# Patient Record
Sex: MALE | Race: WHITE | NOT HISPANIC OR LATINO | URBAN - METROPOLITAN AREA
[De-identification: names, ages, dates, MRNs, and addresses within clinical notes are randomized per-mention and may not be internally consistent; named-entity substitution may affect disease eponyms.]

---

## 2017-03-30 ENCOUNTER — ALLSCRIPTS OFFICE VISIT (OUTPATIENT)
Dept: OTHER | Facility: OTHER | Age: 62
End: 2017-03-30

## 2017-03-30 ENCOUNTER — LAB REQUISITION (OUTPATIENT)
Dept: LAB | Facility: HOSPITAL | Age: 62
End: 2017-03-30
Payer: COMMERCIAL

## 2017-03-30 DIAGNOSIS — Z23 ENCOUNTER FOR IMMUNIZATION: ICD-10-CM

## 2017-03-30 DIAGNOSIS — I10 ESSENTIAL (PRIMARY) HYPERTENSION: ICD-10-CM

## 2017-03-30 DIAGNOSIS — R73.03 PREDIABETES: ICD-10-CM

## 2017-03-30 DIAGNOSIS — Z12.5 ENCOUNTER FOR SCREENING FOR MALIGNANT NEOPLASM OF PROSTATE: ICD-10-CM

## 2017-03-30 DIAGNOSIS — N52.9 MALE ERECTILE DYSFUNCTION: ICD-10-CM

## 2017-03-30 DIAGNOSIS — N28.9 DISORDER OF KIDNEY AND URETER: ICD-10-CM

## 2017-03-30 LAB
ALBUMIN SERPL BCP-MCNC: 3.7 G/DL (ref 3.5–5)
ALP SERPL-CCNC: 59 U/L (ref 46–116)
ALT SERPL W P-5'-P-CCNC: 25 U/L (ref 12–78)
ANION GAP SERPL CALCULATED.3IONS-SCNC: 6 MMOL/L (ref 4–13)
AST SERPL W P-5'-P-CCNC: 14 U/L (ref 5–45)
BASOPHILS # BLD AUTO: 0.01 THOUSANDS/ΜL (ref 0–0.1)
BASOPHILS NFR BLD AUTO: 0 % (ref 0–1)
BILIRUB SERPL-MCNC: 0.59 MG/DL (ref 0.2–1)
BUN SERPL-MCNC: 16 MG/DL (ref 5–25)
CALCIUM SERPL-MCNC: 9.3 MG/DL (ref 8.3–10.1)
CHLORIDE SERPL-SCNC: 105 MMOL/L (ref 100–108)
CHOLEST SERPL-MCNC: 148 MG/DL (ref 50–200)
CO2 SERPL-SCNC: 30 MMOL/L (ref 21–32)
CREAT SERPL-MCNC: 1.06 MG/DL (ref 0.6–1.3)
EOSINOPHIL # BLD AUTO: 0.19 THOUSAND/ΜL (ref 0–0.61)
EOSINOPHIL NFR BLD AUTO: 3 % (ref 0–6)
ERYTHROCYTE [DISTWIDTH] IN BLOOD BY AUTOMATED COUNT: 14.5 % (ref 11.6–15.1)
EST. AVERAGE GLUCOSE BLD GHB EST-MCNC: 126 MG/DL
GFR SERPL CREATININE-BSD FRML MDRD: >60 ML/MIN/1.73SQ M
GLUCOSE P FAST SERPL-MCNC: 101 MG/DL (ref 65–99)
HBA1C MFR BLD: 6 % (ref 4.2–6.3)
HCT VFR BLD AUTO: 48.9 % (ref 36.5–49.3)
HDLC SERPL-MCNC: 57 MG/DL (ref 40–60)
HGB BLD-MCNC: 16.2 G/DL (ref 12–17)
LDLC SERPL CALC-MCNC: 71 MG/DL (ref 0–100)
LYMPHOCYTES # BLD AUTO: 2.81 THOUSANDS/ΜL (ref 0.6–4.47)
LYMPHOCYTES NFR BLD AUTO: 38 % (ref 14–44)
MCH RBC QN AUTO: 28.7 PG (ref 26.8–34.3)
MCHC RBC AUTO-ENTMCNC: 33.1 G/DL (ref 31.4–37.4)
MCV RBC AUTO: 87 FL (ref 82–98)
MONOCYTES # BLD AUTO: 0.82 THOUSAND/ΜL (ref 0.17–1.22)
MONOCYTES NFR BLD AUTO: 11 % (ref 4–12)
NEUTROPHILS # BLD AUTO: 3.48 THOUSANDS/ΜL (ref 1.85–7.62)
NEUTS SEG NFR BLD AUTO: 48 % (ref 43–75)
NRBC BLD AUTO-RTO: 0 /100 WBCS
PLATELET # BLD AUTO: 245 THOUSANDS/UL (ref 149–390)
PMV BLD AUTO: 10.2 FL (ref 8.9–12.7)
POTASSIUM SERPL-SCNC: 4.6 MMOL/L (ref 3.5–5.3)
PROT SERPL-MCNC: 7.4 G/DL (ref 6.4–8.2)
PSA SERPL-MCNC: 1.1 NG/ML (ref 0–4)
RBC # BLD AUTO: 5.65 MILLION/UL (ref 3.88–5.62)
SODIUM SERPL-SCNC: 141 MMOL/L (ref 136–145)
TRIGL SERPL-MCNC: 100 MG/DL
TSH SERPL DL<=0.05 MIU/L-ACNC: 1.13 UIU/ML (ref 0.36–3.74)
WBC # BLD AUTO: 7.32 THOUSAND/UL (ref 4.31–10.16)

## 2017-03-30 PROCEDURE — 84443 ASSAY THYROID STIM HORMONE: CPT | Performed by: FAMILY MEDICINE

## 2017-03-30 PROCEDURE — G0103 PSA SCREENING: HCPCS | Performed by: FAMILY MEDICINE

## 2017-03-30 PROCEDURE — 80053 COMPREHEN METABOLIC PANEL: CPT | Performed by: FAMILY MEDICINE

## 2017-03-30 PROCEDURE — 85025 COMPLETE CBC W/AUTO DIFF WBC: CPT | Performed by: FAMILY MEDICINE

## 2017-03-30 PROCEDURE — 83036 HEMOGLOBIN GLYCOSYLATED A1C: CPT | Performed by: FAMILY MEDICINE

## 2017-03-30 PROCEDURE — 80061 LIPID PANEL: CPT | Performed by: FAMILY MEDICINE

## 2017-04-01 ENCOUNTER — GENERIC CONVERSION - ENCOUNTER (OUTPATIENT)
Dept: OTHER | Facility: OTHER | Age: 62
End: 2017-04-01

## 2017-10-03 ENCOUNTER — ALLSCRIPTS OFFICE VISIT (OUTPATIENT)
Dept: OTHER | Facility: OTHER | Age: 62
End: 2017-10-03

## 2017-10-03 ENCOUNTER — LAB REQUISITION (OUTPATIENT)
Dept: LAB | Facility: HOSPITAL | Age: 62
End: 2017-10-03
Payer: COMMERCIAL

## 2017-10-03 DIAGNOSIS — N52.9 MALE ERECTILE DYSFUNCTION: ICD-10-CM

## 2017-10-03 DIAGNOSIS — R73.03 PREDIABETES: ICD-10-CM

## 2017-10-03 DIAGNOSIS — I10 ESSENTIAL (PRIMARY) HYPERTENSION: ICD-10-CM

## 2017-10-03 DIAGNOSIS — N28.9 DISORDER OF KIDNEY AND URETER: ICD-10-CM

## 2017-10-03 DIAGNOSIS — N40.0 ENLARGED PROSTATE WITHOUT LOWER URINARY TRACT SYMPTOMS (LUTS): ICD-10-CM

## 2017-10-03 LAB
ALBUMIN SERPL BCP-MCNC: 3.8 G/DL (ref 3.5–5)
ALP SERPL-CCNC: 59 U/L (ref 46–116)
ALT SERPL W P-5'-P-CCNC: 25 U/L (ref 12–78)
ANION GAP SERPL CALCULATED.3IONS-SCNC: 7 MMOL/L (ref 4–13)
AST SERPL W P-5'-P-CCNC: 18 U/L (ref 5–45)
BILIRUB SERPL-MCNC: 0.78 MG/DL (ref 0.2–1)
BUN SERPL-MCNC: 15 MG/DL (ref 5–25)
CALCIUM SERPL-MCNC: 9.7 MG/DL (ref 8.3–10.1)
CHLORIDE SERPL-SCNC: 105 MMOL/L (ref 100–108)
CO2 SERPL-SCNC: 26 MMOL/L (ref 21–32)
CREAT SERPL-MCNC: 1.06 MG/DL (ref 0.6–1.3)
EST. AVERAGE GLUCOSE BLD GHB EST-MCNC: 123 MG/DL
GFR SERPL CREATININE-BSD FRML MDRD: 74 ML/MIN/1.73SQ M
GLUCOSE SERPL-MCNC: 112 MG/DL (ref 65–140)
HBA1C MFR BLD: 5.9 % (ref 4.2–6.3)
POTASSIUM SERPL-SCNC: 5.2 MMOL/L (ref 3.5–5.3)
PROT SERPL-MCNC: 7.4 G/DL (ref 6.4–8.2)
SODIUM SERPL-SCNC: 138 MMOL/L (ref 136–145)

## 2017-10-03 PROCEDURE — 80053 COMPREHEN METABOLIC PANEL: CPT | Performed by: FAMILY MEDICINE

## 2017-10-03 PROCEDURE — 83036 HEMOGLOBIN GLYCOSYLATED A1C: CPT | Performed by: FAMILY MEDICINE

## 2017-10-05 NOTE — PROGRESS NOTES
Assessment  1  Benign essential hypertension (401 1) (I10)   2  Prediabetes (790 29) (R73 03)   3  Erectile dysfunction (607 84) (N52 9)   4  Abnormal kidney function (593 9) (N28 9)   5  BPH (benign prostatic hyperplasia) (600 00) (N40 0)    Plan  Abnormal kidney function, Benign essential hypertension, BPH (benign prostatic  hyperplasia), Erectile dysfunction, Prediabetes    · Routine Venipuncture - POC; Status:Complete;   Done: [id number] 09:05AM   · (1) COMPREHENSIVE METABOLIC PANEL; Status:Complete;   Done: 65DWR1844  09:04AM   · (1) HEMOGLOBIN A1C; Status:Complete;   Done: 67AXF6785 09:04AM  Benign essential hypertension    · Lisinopril 10 MG Oral Tablet; Take 1 tablet daily  BPH (benign prostatic hyperplasia)    · Tamsulosin HCl - 0 4 MG Oral Capsule; take 1 capsule daily  Need for influenza vaccination    · Fluzone Quadrivalent Intramuscular Suspension    Discussion/Summary    Patient is a 19-year-old MHTN - blood pressure appears stable today  Continue with current treatment of lisinopril  He was encouraged to try to check his blood pressure at home  Prediabetes - recheck blood work including CBC, CMP, TSH, FLP, PSA, A1c  Continue with strict dietary and exercise plan  TANVIR - stable  Continue with current treatment of lisinopril  He was advised on the importance of maintaining proper fluid hydration area  Follow-up if any symptoms are worseningBPH - Sx appear sec to prostatic hypertrophy  start tx with tamsulosin  F/u if sx are worsening or persistent  Chief Complaint  Pt presents for 6 month check up with no recent labs  Pt needs refill on Lisinopril  Pt would like fasting blood work done today  Pt would like flu shot today  Patient is here today for follow up of chronic conditions described in HPI  History of Present Illness  Pt is a 59 yo M presents today for a f/u on his chronic conditions  He has HTN, prediabetes, erectile dysfunction   He has been taking his medications reg and tolerating them very well  He has a cc of increased urinary freq at night time  denies dyuria, feveres chills, urgency  Review of Systems    Constitutional: not feeling poorly-and-not feeling tired  Eyes: no eyesight problems-and-no purulent discharge from the eyes  ENT: no sore throat-and-no nasal discharge  Cardiovascular: no chest pain-and-no palpitations  Respiratory: no cough-and-no shortness of breath during exertion  Gastrointestinal: no nausea-and-no diarrhea  Genitourinary: no dysuria-and-no nocturia  Musculoskeletal: no arthralgias-and-no myalgias  Integumentary: no rashes-and-no skin lesions  Neurological: no headache,-no numbness-and-no dizziness  Psychiatric: no anxiety-and-no depression  Endocrine: no muscle weakness-and-no erectile dysfunction  Hematologic/Lymphatic: no tendency for easy bleeding-and-no tendency for easy bruising  Active Problems  1  Abnormal kidney function (593 9) (N28 9)   2  Benign essential hypertension (401 1) (I10)   3  Encounter for screening for malignant neoplasm of prostate (V76 44) (Z12 5)   4  Erectile dysfunction (607 84) (N52 9)   5  Prediabetes (790 29) (R73 03)   6  Screening for colon cancer (V76 51) (Z12 11)    Past Medical History  1  History of Diverticulitis (562 11) (K57 92)   2  History of Fracture of great toe, left, closed (826 0) (S92 402A)   3  History of hypertension (V12 59) (Z86 79)   4  History of IFG (impaired fasting glucose) (790 21) (R73 01)   5  History of Inguinal hernia (550 90) (K40 90)   6  History of Need for influenza vaccination (V04 81) (Z23)   7  History of Positive PPD (795 51) (R76 11)   8  History of Screening for cholesterol level (V77 91) (Z13 220)   9  History of Screening for diabetes mellitus (V77 1) (Z13 1)   10  History of Screening for prostate cancer (V76 44) (Z12 5)   11  History of Screening for thyroid disorder (V77 0) (Z13 29)   12   History of Verruca warts (infectious) (078 19) (B07 9)    The active problems and past medical history were reviewed and updated today  Surgical History    The surgical history was reviewed and updated today  Family History  Mother    1  Family history of kidney disease (V18 69) (Z84 1)  Father    2  Family history of tremor (V17 2) (Z82 0)   3  Family history of Type 2 diabetes mellitus with complication  Brother    4  Family history of tremor (V17 2) (Z82 0)  Paternal Grandfather    5  Family history of hypertension (V17 49) (Z82 49)   6  Family history of Type 2 diabetes mellitus with complication  Uncle    7  Family history of Type 2 diabetes mellitus with complication    The family history was reviewed and updated today  Social History   · Denied: History of Alcohol use   · Denied: History of Drug use   · Never a smoker  The social history was reviewed and updated today  The social history was reviewed and is unchanged  Current Meds   1  Lisinopril 10 MG Oral Tablet; Take 1 tablet daily; Therapy: 25Apr2016 to (Last Rx:30Mar2017)  Requested for: 18Apr2017; Status: ACTIVE   - Renewal Denied Ordered    The medication list was reviewed and updated today  Allergies  1  No Known Drug Allergies    Vitals  Vital Signs    Recorded: 39BVB7654 08:07AM   Temperature 96 2 F, Tympanic   Heart Rate 92   Pulse Quality Normal   Respiration Quality Normal   Respiration 18   Systolic 328, LUE, Sitting   Diastolic 88, LUE, Sitting   Height 5 ft 2 in   Weight 136 lb 5 oz   BMI Calculated 24 93   BSA Calculated 1 62   O2 Saturation 98, RA   Pain Scale 0     Physical Exam    Constitutional   General appearance: No acute distress, well appearing and well nourished  Eyes   Conjunctiva and lids: No swelling, erythema, or discharge  Pupils and irises: Equal, round and reactive to light  Ears, Nose, Mouth, and Throat   External inspection of ears and nose: Normal     Otoscopic examination: Tympanic membrance translucent with normal light reflex   Canals patent without erythema  Nasal mucosa, septum, and turbinates: Normal without edema or erythema  Oropharynx: Normal with no erythema, edema, exudate or lesions  Pulmonary   Respiratory effort: No increased work of breathing or signs of respiratory distress  Auscultation of lungs: Clear to auscultation, equal breath sounds bilaterally, no wheezes, no rales, no rhonci  Cardiovascular   Auscultation of heart: Normal rate and rhythm, normal S1 and S2, without murmurs  Examination of extremities for edema and/or varicosities: Normal     Abdomen   Abdomen: Non-tender, no masses  Liver and spleen: No hepatomegaly or splenomegaly  Lymphatic   Palpation of lymph nodes in neck: No lymphadenopathy  Musculoskeletal   Gait and station: Normal     Inspection/palpation of joints, bones, and muscles: Normal     Skin   Skin and subcutaneous tissue: Normal without rashes or lesions  Neurologic   Cranial nerves: Cranial nerves 2-12 intact  Sensation: No sensory loss  Psychiatric   Orientation to person, place and time: Normal     Mood and affect: Normal          Results/Data  (1) COMPREHENSIVE METABOLIC PANEL [id number] 18:32WT Walter Matters Order Number: [id number]     Test Name Result Flag Reference   GLUCOSE,RANDM 112 mg/dL     If the patient is fasting, the ADA then defines impaired fasting glucose as > 100 mg/dL and diabetes as > or equal to 123 mg/dL  Specimen collection should occur prior to Sulfasalazine administration due to the potential for falsely depressed results  Specimen collection should occur prior to Sulfapyridine administration due to the potential for falsely elevated results     SODIUM 138 mmol/L  136-145   POTASSIUM 5 2 mmol/L  3 5-5 3   CHLORIDE 105 mmol/L  100-108   CARBON DIOXIDE 26 mmol/L  21-32   ANION GAP (CALC) 7 mmol/L  4-13   BLOOD UREA NITROGEN 15 mg/dL  5-25   CREATININE 1 06 mg/dL  0 60-1 30   Standardized to IDMS reference method   CALCIUM 9 7 mg/dL 8  3-10 1   BILI, TOTAL 0 78 mg/dL  0 20-1 00   ALK PHOSPHATAS 59 U/L     ALT (SGPT) 25 U/L  12-78   Specimen collection should occur prior to Sulfasalazine and/or Sulfapyridine administration due to the potential for falsely depressed results  AST(SGOT) 18 U/L  5-45   Specimen collection should occur prior to Sulfasalazine administration due to the potential for falsely depressed results  ALBUMIN 3 8 g/dL  3 5-5 0   TOTAL PROTEIN 7 4 g/dL  6 4-8 2   eGFR 74 ml/min/1 73sq m     [facility] Disease Education Program recommendations are as follows:  GFR calculation is accurate only with a steady state creatinine  Chronic Kidney disease less than 60 ml/min/1 73 sq  meters  Kidney failure less than 15 ml/min/1 73 sq  meters  (1) HEMOGLOBIN A1C 03Oct2017 09:04AM Kymberly Leal Order Number: [id number]     Test Name Result Flag Reference   HEMOGLOBIN A1C 5 9 %  4 2-6 3   EST  AVG  GLUCOSE 123 mg/dl         Future Appointments    Date/Time Provider Specialty Site   04/06/2018 07:45 AM Radha Kline DO [facility] [address]     Signatures   Electronically signed by :  Genna Mcgill DO; Oct  4 2017  8:49AM EST                       (Author)

## 2018-01-09 NOTE — RESULT NOTES
Message   Please call patient informed him that all of his recent bloodwork appeared stable  There was mild elevation in his kidney function test  Causes for this can be mild dehydration versus medication that he's currently on for his blood pressure Such as lisinopril  At this time, please have patient keep his scheduled appointment in April  I would like him to check his kidney function tests and again in January  I will be placing orders  Thanks     Verified Results  (1) COMPREHENSIVE METABOLIC PANEL [id number] 90:77HM John Ariaso     Test Name Result Flag Reference   GLUCOSE 109 mg/dL H 65-99   Fasting reference interval   UREA NITROGEN (BUN) 17 mg/dL  7-25   CREATININE 1 26 mg/dL H 0 70-1 25   For patients >52years of age, the reference limit  for Creatinine is approximately 13% higher for people  identified as -American  eGFR NON-AFR  AMERICAN 61 mL/min/1 73m2  > OR = 60   eGFR AFRICAN AMERICAN 70 mL/min/1 73m2  > OR = 60   BUN/CREATININE RATIO 13 (calc)  6-22   SODIUM 139 mmol/L  135-146   POTASSIUM 4 7 mmol/L  3 5-5 3   CHLORIDE 105 mmol/L     CARBON DIOXIDE 26 mmol/L  20-31   CALCIUM 9 6 mg/dL  8 6-10 3   PROTEIN, TOTAL 6 8 g/dL  6 1-8 1   ALBUMIN 4 2 g/dL  3 6-5 1   GLOBULIN 2 6 g/dL (calc)  1 9-3 7   ALBUMIN/GLOBULIN RATIO 1 6 (calc)  1 0-2 5   BILIRUBIN, TOTAL 0 7 mg/dL  0 2-1 2   ALKALINE PHOSPHATASE 56 U/L     AST 16 U/L  10-35   ALT 17 U/L  9-46     (Q) LIPID PANEL WITH REFLEX TO DIRECT LDL [id number] 12:00AM Ailyn Lucero     Test Name Result Flag Reference   CHOLESTEROL, TOTAL 163 mg/dL  125-200   HDL CHOLESTEROL 54 mg/dL  > OR = 40   TRIGLICERIDES 608 mg/dL H <150   LDL-CHOLESTEROL 76 mg/dL (calc)  <130   Desirable range <100 mg/dL for patients with CHD or  diabetes and <70 mg/dL for diabetic patients with  known heart disease     CHOL/HDLC RATIO 3 0 (calc)  < OR = 5 0   NON HDL CHOLESTEROL 109 mg/dL (calc)     Target for non-HDL cholesterol is 30 mg/dL higher than   LDL cholesterol target  (Q) HEMOGLOBIN A1c [id number] 12:00AM [facility]ster Edson Rodrigo     Test Name Result Flag Reference   HEMOGLOBIN A1c 6 0 % of total Hgb H <5 7   According to ADA guidelines, hemoglobin A1c <7 0%  represents optimal control in non-pregnant diabetic  patients  Different metrics may apply to specific  patient populations  Standards of Medical Care in    Diabetes Care  2013;36:s11-s66     For the purpose of screening for the presence of  diabetes  <5 7%       Consistent with the absence of diabetes  5 7-6 4%    Consistent with increased risk for diabetes              (prediabetes)  >or=6 5%    Consistent with diabetes     This assay result is consistent with an increased risk  of diabetes  Currently, no consensus exists for use of hemoglobin  A1c for diagnosis of diabetes for children  Plan  Abnormal kidney function, Benign essential hypertension    · (1) BASIC METABOLIC PROFILE; Status:Active;  Requested RCP:[id number];

## 2018-01-10 NOTE — RESULT NOTES
Message   These call patient, reviewed all of his blood work  All levels appeared stable  Thank you very much     Verified Results  (Q) CBC (INCLUDES DIFF/PLT) (REFL) 21Apr2016 12:00AM Nic [employer]     Test Name Result Flag Reference   WHITE BLOOD CELL COUNT 7 8 Thousand/uL  3 8-10 8   RED BLOOD CELL COUNT 5 79 Million/uL  4 20-5 80   HEMOGLOBIN 16 3 g/dL  13 2-17 1   HEMATOCRIT 51 8 % H 38 5-50 0   MCV 89 5 fL  80 0-100 0   MCH 28 2 pg  27 0-33 0   MCHC 31 5 g/dL L 32 0-36 0   RDW 13 8 %  11 0-15 0   PLATELET COUNT 850 Thousand/uL  140-400   MPV 8 2 fL  7 5-11 5   ABSOLUTE NEUTROPHILS 3463 cells/uL  1685-3317   ABSOLUTE LYMPHOCYTES 3284 cells/uL  850-3900   ABSOLUTE MONOCYTES 803 cells/uL  200-950   ABSOLUTE EOSINOPHILS 234 cells/uL     ABSOLUTE BASOPHILS 16 cells/uL  0-200   NEUTROPHILS 44 4 %     LYMPHOCYTES 42 1 %     MONOCYTES 10 3 %     EOSINOPHILS 3 0 %     BASOPHILS 0 2 %       (Q) COMPREHENSIVE METABOLIC PNL W/ADJUSTED CALCIUM 21Apr2016 12:00AM Zonder     Test Name Result Flag Reference   GLUCOSE 111 mg/dL H 65-99   Fasting reference interval   UREA NITROGEN (BUN) 14 mg/dL  7-25   CREATININE 0 99 mg/dL  0 70-1 25   For patients >52years of age, the reference limit  for Creatinine is approximately 13% higher for people  identified as -American  eGFR NON-AFR   AMERICAN 81 mL/min/1 73m2  > OR = 60   eGFR AFRICAN AMERICAN 94 mL/min/1 73m2  > OR = 60   BUN/CREATININE RATIO   7-54   NOT APPLICABLE (calc)   SODIUM 139 mmol/L  135-146   POTASSIUM 4 8 mmol/L  3 5-5 3   CHLORIDE 104 mmol/L     CARBON DIOXIDE 25 mmol/L  19-30   CALCIUM 9 3 mg/dL  8 6-10 3   CALCIUM (ADJUSTED FOR$ALBUMIN) 9 5 mg/dL (calc)  8 6-10 2   PROTEIN, TOTAL 7 1 g/dL  6 1-8 1   ALBUMIN 4 1 g/dL  3 6-5 1   GLOBULIN 3 0 g/dL (calc)  1 9-3 7   ALBUMIN/GLOBULIN RATIO 1 4 (calc)  1 0-2 5   BILIRUBIN, TOTAL 0 6 mg/dL  0 2-1 2   ALKALINE PHOSPHATASE 54 U/L     AST 18 U/L  10-35   ALT 18 U/L  9-46     (Q) HEMOGLOBIN A1c WITH eAG 21Apr2016 12:00AM Kathy Ferguson     Test Name Result Flag Reference   HEMOGLOBIN A1c 5 9 % of total Hgb H <5 7   According to ADA guidelines, hemoglobin A1c <7 0%  represents optimal control in non-pregnant diabetic  patients  Different metrics may apply to specific  patient populations  Standards of Medical Care in    Diabetes Care  2013;36:s11-s66     For the purpose of screening for the presence of  diabetes  <5 7%       Consistent with the absence of diabetes  5 7-6 4%    Consistent with increased risk for diabetes              (prediabetes)  >or=6 5%    Consistent with diabetes     This assay result is consistent with an increased risk  of diabetes  Currently, no consensus exists for use of hemoglobin  A1c for diagnosis of diabetes for children  eAG (mg/dL) 123 (calc)     eAG (mmol/L) 6 8 (calc)       (Q) LIPID PANEL WITH REFLEX TO DIRECT LDL 21Apr2016 12:00AM Eduar Luceroab     Test Name Result Flag Reference   CHOLESTEROL, TOTAL 165 mg/dL  125-200   HDL CHOLESTEROL 51 mg/dL  > OR = 40   TRIGLICERIDES 349 mg/dL H <150   LDL-CHOLESTEROL 77 mg/dL (calc)  <130   Desirable range <100 mg/dL for patients with CHD or  diabetes and <70 mg/dL for diabetic patients with  known heart disease  CHOL/HDLC RATIO 3 2 (calc)  < OR = 5 0   NON HDL CHOLESTEROL 114 mg/dL (calc)     Target for non-HDL cholesterol is 30 mg/dL higher than   LDL cholesterol target  (Q) TSH, 3RD GENERATION W/REFLEX TO FT4 21Apr2016 12:00AM Nic Ihab     Test Name Result Flag Reference   TSH W/REFLEX TO FT4 1 55 mIU/L  0 40-4 50     (Q) PSA, TOTAL WITH REFLEX TO PSA, FREE 21Apr2016 12:00AM Abdmonicaal, Ihab     Test Name Result Flag Reference   TOTAL PSA 1 2 ng/mL  < OR = 4 0   This test was performed using the Cristina Chip  immunoassay method  Values obtained with other assay  methods cannot be used interchangeably   PSA levels,  regardless of value, should not be interpreted as  absolute evidence of the presence or absence of disease

## 2018-01-10 NOTE — RESULT NOTES
Verified Results  (1) COMPREHENSIVE METABOLIC PANEL [id number] 91:62WT Maggie Matos   TW Order Number: [id number]     Test Name Result Flag Reference   GLUCOSE,RANDM 112 mg/dL     If the patient is fasting, the ADA then defines impaired fasting glucose as > 100 mg/dL and diabetes as > or equal to 123 mg/dL  Specimen collection should occur prior to Sulfasalazine administration due to the potential for falsely depressed results  Specimen collection should occur prior to Sulfapyridine administration due to the potential for falsely elevated results  SODIUM 138 mmol/L  136-145   POTASSIUM 5 2 mmol/L  3 5-5 3   CHLORIDE 105 mmol/L  100-108   CARBON DIOXIDE 26 mmol/L  21-32   ANION GAP (CALC) 7 mmol/L  4-13   BLOOD UREA NITROGEN 15 mg/dL  5-25   CREATININE 1 06 mg/dL  0 60-1 30   Standardized to IDMS reference method   CALCIUM 9 7 mg/dL  8 3-10 1   BILI, TOTAL 0 78 mg/dL  0 20-1 00   ALK PHOSPHATAS 59 U/L     ALT (SGPT) 25 U/L  12-78   Specimen collection should occur prior to Sulfasalazine and/or Sulfapyridine administration due to the potential for falsely depressed results  AST(SGOT) 18 U/L  5-45   Specimen collection should occur prior to Sulfasalazine administration due to the potential for falsely depressed results  ALBUMIN 3 8 g/dL  3 5-5 0   TOTAL PROTEIN 7 4 g/dL  6 4-8 2   eGFR 74 ml/min/1 73sq m     [facility] Disease Education Program recommendations are as follows:  GFR calculation is accurate only with a steady state creatinine  Chronic Kidney disease less than 60 ml/min/1 73 sq  meters  Kidney failure less than 15 ml/min/1 73 sq  meters  (1) HEMOGLOBIN A1C 03Oct2017 09:04AM Adan Judd Order Number: [id number]     Test Name Result Flag Reference   HEMOGLOBIN A1C 5 9 %  4 2-6 3   EST  AVG   GLUCOSE 123 mg/dl         Plan  Abnormal kidney function, Benign essential hypertension, BPH (benign prostatic  hyperplasia), Erectile dysfunction, Prediabetes · Routine Venipuncture - POC; Status:Complete;   Done: 92FAN0941 09:05AM   · (1) COMPREHENSIVE METABOLIC PANEL; Status:Complete;   Done: 34JRW9067  09:04AM   · (1) HEMOGLOBIN A1C; Status:Complete;   Done: 98NUC1728 09:04AM  Benign essential hypertension    · Lisinopril 10 MG Oral Tablet;  Take 1 tablet daily  BPH (benign prostatic hyperplasia)    · Tamsulosin HCl - 0 4 MG Oral Capsule; take 1 capsule daily  Need for influenza vaccination    · Fluzone Quadrivalent Intramuscular Suspension

## 2018-01-13 VITALS
OXYGEN SATURATION: 99 % | HEART RATE: 84 BPM | BODY MASS INDEX: 25.58 KG/M2 | SYSTOLIC BLOOD PRESSURE: 130 MMHG | RESPIRATION RATE: 16 BRPM | TEMPERATURE: 97.5 F | WEIGHT: 139 LBS | HEIGHT: 62 IN | DIASTOLIC BLOOD PRESSURE: 90 MMHG

## 2018-01-14 VITALS
TEMPERATURE: 96.2 F | RESPIRATION RATE: 18 BRPM | WEIGHT: 136.31 LBS | SYSTOLIC BLOOD PRESSURE: 138 MMHG | BODY MASS INDEX: 25.08 KG/M2 | OXYGEN SATURATION: 98 % | HEIGHT: 62 IN | DIASTOLIC BLOOD PRESSURE: 88 MMHG | HEART RATE: 92 BPM

## 2018-01-15 NOTE — PROGRESS NOTES
Chief Complaint  Pt was in this morning for FBW and BP check  Pt states he took his BP this morning which read 160/91  Pt states he has been keeping a log of his BP but did not bring it with him today  Pt also did not have his BP machine to compare results in office  Pt's BP in office today was 148/84  Pt also mentioned that he has an up coming appt and will continue to keep a log and will bring it with him to his appt  Active Problems    1  Benign essential hypertension (401 1) (I10)   2  Erectile dysfunction (607 84) (N52 9)   3  Prediabetes (790 29) (R73 09)   4  Screening for cholesterol level (V77 91) (Z13 220)   5  Screening for diabetes mellitus (V77 1) (Z13 1)   6  Screening for prostate cancer (V76 44) (Z12 5)   7  Screening for thyroid disorder (V77 0) (Z13 29)   8  Verruca warts (infectious) (078 19) (B07 9)    Current Meds   1  Metoprolol Succinate ER 50 MG Oral Tablet Extended Release 24 Hour; Take 1 tablet   daily; Therapy: 59Wwt5181 to (Evaluate:11Oct2016); Last Rx:[id number] Ordered   2  Viagra 100 MG Oral Tablet; TAKE 1 TABLET DAILY 1 HOUR BEFORE NEEDED; Therapy: 88Iie8439 to (Evaluate:20May2016); Last Rx:[id number] Ordered    Allergies    1  No Known Drug Allergies    Vitals  Signs [Data Includes: Current Encounter]    Systolic: 639, LUE, Sitting  Diastolic: 84, LUE, Sitting    Assessment    1  Benign essential hypertension (401 1) (I10)   2  Erectile dysfunction (607 84) (N52 9)   3  Prediabetes (790 29) (R73 09)   4  Screening for cholesterol level (V77 91) (Z13 220)   5  Screening for diabetes mellitus (V77 1) (Z13 1)   6  Screening for prostate cancer (V76 44) (Z12 5)   7  Screening for thyroid disorder (V77 0) (Z13 29)   8   Verruca warts (infectious) (078 19) (B07 9)    Plan  Benign essential hypertension, Erectile dysfunction, Prediabetes, Screening for  cholesterol level, Screening for diabetes mellitus, Screening for prostate cancer,  Screening for thyroid disorder, Verruca warts (infectious)    · (Q) CBC (INCLUDES DIFF/PLT) (REFL); Status:Active; Requested for:14Apr2016;    · (Q) COMPREHENSIVE METABOLIC PNL W/ADJUSTED CALCIUM; Status:Active; Requested for:14Apr2016;    · (Q) HEMOGLOBIN A1c WITH eAG; Status:Active; Requested for:14Apr2016;    · (Q) LIPID PANEL WITH REFLEX TO DIRECT LDL; Status:Active; Requested  for:14Apr2016;    · (Q) PSA, TOTAL WITH REFLEX TO PSA, FREE; Status:Active; Requested for:14Apr2016;     · (Q) TSH, 3RD GENERATION W/REFLEX TO FT4; Status:Active; Requested  for:14Apr2016;   Verruca warts (infectious)    · Routine Venipuncture - POC; Status:Complete;   Done: 21Apr2016    Discussion/Summary    Follow-up with patient on his appointment on 4/25  If BP is persistently elevated, consider starting anti-hypertensive agent  Future Appointments    Date/Time Provider Specialty Site   04/25/2016 08:00 AM Curtis Rene DO [address]   10/17/2016 08:30 AM Curtis Rene DO [address]     Signatures   Electronically signed by : Ailyn Lucero DO;  Apr 22 2016  8:25AM EST                       (Author)

## 2018-01-16 NOTE — RESULT NOTES
Message   Please call patient, inform him that all of his recent bloodwork appear normal  Thank you     Verified Results  (1) CBC/PLT/DIFF [id number] 09:37PM Ailyn Lucero     Test Name Result Flag Reference   WBC COUNT 7 32 Thousand/uL  4 31-10 16   RBC COUNT 5 65 Million/uL H 3 88-5 62   HEMOGLOBIN 16 2 g/dL  12 0-17 0   HEMATOCRIT 48 9 %  36 5-49 3   MCV 87 fL  82-98   MCH 28 7 pg  26 8-34 3   MCHC 33 1 g/dL  31 4-37 4   RDW 14 5 %  11 6-15 1   MPV 10 2 fL  8 9-12 7   PLATELET COUNT 877 Thousands/uL  149-390   nRBC AUTOMATED 0 /100 WBCs     NEUTROPHILS RELATIVE PERCENT 48 %  43-75   LYMPHOCYTES RELATIVE PERCENT 38 %  14-44   MONOCYTES RELATIVE PERCENT 11 %  4-12   EOSINOPHILS RELATIVE PERCENT 3 %  0-6   BASOPHILS RELATIVE PERCENT 0 %  0-1   NEUTROPHILS ABSOLUTE COUNT 3 48 Thousands/? ??L  1 85-7 62   LYMPHOCYTES ABSOLUTE COUNT 2 81 Thousands/? ??L  0 60-4 47   MONOCYTES ABSOLUTE COUNT 0 82 Thousand/? ??L  0 17-1 22   EOSINOPHILS ABSOLUTE COUNT 0 19 Thousand/? ??L  0 00-0 61   BASOPHILS ABSOLUTE COUNT 0 01 Thousands/? ??L  0 00-0 10   This bloodwork is non-fasting  Please drink two glasses of water morning of  bloodwork  This bloodwork is non-fasting  Please drink two glasses of water morning of bloodwork       (1) COMPREHENSIVE METABOLIC PANEL [id number] 67:51DL Ailyn Lucero     Test Name Result Flag Reference   SODIUM 141 mmol/L  136-145   POTASSIUM 4 6 mmol/L  3 5-5 3   CHLORIDE 105 mmol/L  100-108   CARBON DIOXIDE 30 mmol/L  21-32   ANION GAP (CALC) 6 mmol/L  4-13   BLOOD UREA NITROGEN 16 mg/dL  5-25   CREATININE 1 06 mg/dL  0 60-1 30   Standardized to IDMS reference method   CALCIUM 9 3 mg/dL  8 3-10 1   BILI, TOTAL 0 59 mg/dL  0 20-1 00   ALK PHOSPHATAS 59 U/L     ALT (SGPT) 25 U/L  12-78   AST(SGOT) 14 U/L  5-45   ALBUMIN 3 7 g/dL  3 5-5 0   TOTAL PROTEIN 7 4 g/dL  6 4-8 2   eGFR Non-African American      >60 0 ml/min/1 73sq m   This is a fasting blood test  Water,black tea or black  coffee only after 9:00pm the night before test  Drink 2 glasses of water the morning of test   National Kidney Disease Education Program recommendations are as follows:  GFR calculation is accurate only with a steady state creatinine  Chronic Kidney disease less than 60 ml/min/1 73 sq  meters  Kidney failure less than 15 ml/min/1 73 sq  meters  GLUCOSE FASTING 101 mg/dL H 65-99     (1) LIPID PANEL FASTING W DIRECT LDL REFLEX [id number] 09:37PM Ailyn Lucero     Test Name Result Flag Reference   CHOLESTEROL 148 mg/dL     LDL CHOLESTEROL CALCULATED 71 mg/dL  0-100   This is a fasting blood test  Water,black tea or black  coffee only after 9:00pm the night before test  Drink 2 glasses of water the morning of test     This is a fasting blood test  Water,black tea or black  coffee only after 9:00pm the night before test  Drink 2 glasses of water the morning of test   Triglyceride:         Normal              <150 mg/dl       Borderline High    150-199 mg/dl       High               200-499 mg/dl       Very High          >499 mg/dl  Cholesterol:         Desirable        <200 mg/dl      Borderline High  200-239 mg/dl      High             >239 mg/dl  HDL Cholesterol:        High    >59 mg/dL      Low     <41 mg/dL  LDL Cholesterol:        Optimal          <100 mg/dl        Near Optimal     100-129 mg/dl        Above Optimal          Borderline High   130-159 mg/dl          High              160-189 mg/dl          Very High        >189 mg/dl  LDL CALCULATED:    This screening LDL is a calculated result  It does not have the accuracy of the Direct Measured LDL in the monitoring of patients with hyperlipidemia and/or statin therapy  Direct Measure LDL (LVZ176) must be ordered separately in these patients  TRIGLYCERIDES 100 mg/dL  <=150   Specimen collection should occur prior to N-Acetylcysteine or Metamizole administration due to the potential for falsely depressed results     HDL,DIRECT 57 mg/dL  40-60   Specimen collection should occur prior to Metamizole administration due to the potential for falsely depressed results  (1) TSH WITH FT4 REFLEX [id number] 09:37PM Nic, [facility]     Test Name Result Flag Reference   TSH 1 130 uIU/mL  0 358-3 740   This is a fasting blood test  Water,black tea or black  coffee only after 9:00pm the night before test  Drink 2 glasses of water the morning of test   Patients undergoing fluorescein dye angiography may retain small amounts of fluorescein in the body for 48-72 hours post procedure  Samples containing fluorescein can produce falsely depressed TSH values  If the patient had this procedure,a specimen should be resubmitted post fluorescein clearance  (1) HEMOGLOBIN A1C [id number] 09:37PM [facility], [facility]     Test Name Result Flag Reference   HEMOGLOBIN A1C 6 0 %  4 2-6 3   EST  AVG  GLUCOSE 126 mg/dl       (1) PSA (SCREEN) (Dx V76 44 Screen for Prostate Cancer) [id number] 09:37PM [facility], [facility]     Test Name Result Flag Reference   PROSTATE SPECIFIC ANTIGEN 1 1 ng/mL  0 0-4 0   American U[employer] Association Guidelines define biochemical recurrence of prostate cancer as a detectable or rising PSA value post-radical prostatectomy that is greater than or equal to 0 2 ng/mL with a second confirmatory level of greater than or equal to 0 2 ng/mL  This bloodwork is non-fasting  Please drink two glasses of water morning of  bloodwork  This bloodwork is non-fasting  Please drink two glasses of water morning of bloodwork

## 2018-03-16 DIAGNOSIS — I10 BENIGN ESSENTIAL HYPERTENSION: Primary | ICD-10-CM

## 2018-03-17 RX ORDER — LISINOPRIL 10 MG/1
TABLET ORAL
Qty: 90 TABLET | Refills: 1 | Status: SHIPPED | OUTPATIENT
Start: 2018-03-17

## 2018-04-03 RX ORDER — TAMSULOSIN HYDROCHLORIDE 0.4 MG/1
1 CAPSULE ORAL DAILY
COMMUNITY
Start: 2017-10-03

## 2021-04-08 DIAGNOSIS — Z23 ENCOUNTER FOR IMMUNIZATION: ICD-10-CM

## 2022-08-02 ENCOUNTER — TELEPHONE (OUTPATIENT)
Dept: FAMILY MEDICINE CLINIC | Facility: CLINIC | Age: 67
End: 2022-08-02

## 2022-08-03 NOTE — TELEPHONE ENCOUNTER
08/03/22 10:05 AM        Thank you for your request  Your request has been received, reviewed, and the patient chart updated  The PCP has successfully been removed with a patient attribution note  This message will now be completed          Thank you  Cassy Apple